# Patient Record
Sex: FEMALE | Race: WHITE
[De-identification: names, ages, dates, MRNs, and addresses within clinical notes are randomized per-mention and may not be internally consistent; named-entity substitution may affect disease eponyms.]

---

## 2016-06-17 VITALS
SYSTOLIC BLOOD PRESSURE: 146 MMHG | SYSTOLIC BLOOD PRESSURE: 146 MMHG | SYSTOLIC BLOOD PRESSURE: 146 MMHG | SYSTOLIC BLOOD PRESSURE: 146 MMHG | DIASTOLIC BLOOD PRESSURE: 78 MMHG | DIASTOLIC BLOOD PRESSURE: 78 MMHG | DIASTOLIC BLOOD PRESSURE: 78 MMHG | DIASTOLIC BLOOD PRESSURE: 78 MMHG

## 2017-03-03 ENCOUNTER — HOSPITAL ENCOUNTER (OUTPATIENT)
Dept: HOSPITAL 61 - CT | Age: 82
Discharge: HOME | End: 2017-03-03
Attending: RADIOLOGY
Payer: MEDICARE

## 2017-03-03 DIAGNOSIS — C34.90: Primary | ICD-10-CM

## 2017-03-03 LAB
CREAT SERPL-MCNC: 0.9 MG/DL (ref 0.6–1)
GFR SERPLBLD BASED ON 1.73 SQ M-ARVRAT: 59.8 ML/MIN

## 2017-03-03 PROCEDURE — 36415 COLL VENOUS BLD VENIPUNCTURE: CPT

## 2017-03-03 PROCEDURE — 71260 CT THORAX DX C+: CPT

## 2017-03-03 PROCEDURE — 82565 ASSAY OF CREATININE: CPT

## 2017-03-03 NOTE — RAD
Exam performed: CT chest with contrast.



History: History of lung cancer, follow-up exam.



Date of service: 03/03/17. Comparison: CT chest from 09/22/16.



Technique: Contiguous helical acquisitions are obtained through the chest

during intravenous administration of 75 cc of Omnipaque 300. Sagittal and

coronal reformatted images are obtained and reviewed.



Findings:



Redemonstration of a right upper lobe pleural-based mass with adjacent erosive

changes and chronic fracture involving the right third rib with streaky linear

opacities extending up to the right hilum. Previously seen large right

suprahilar lymph node appears significantly decreased in size. There is

significantly decreased mass effect and nodularity in the right main bronchus

as well as a bronchus intermedius. There is continued encasement of the right

upper lobe pulmonary artery secondary to the enlarged lymph node however the

significantly improved due to decreased size of the lymph node. No new lymph

nodes are seen. The remainder lungs are essentially clear. There are

emphysematous changes in both lungs. There are prominent interstitial changes

in the apical segment right lower lobe, new since previous study. The heart

size is within limits of normal without pericardial effusion. Atheromatous

aortic and coronary calcification. There is a calcified nodule in the right

lower lobe.



Small hiatal hernia. Abdominal aortic aneurysm status post stent graft

placement. Sclerotic abnormalities involving the thoracic spine at T10 and L1

remains similar.



Impression:



1.  Significant decrease in size of the right suprahilar lymph node with

improved nodularity and mass effect on the adjacent bronchus intermedius.

2.  Right upper lobe mass extending to the periphery of the chest wall with

sclerosis /erosion and chronic fracture of the right third rib remains

unchanged.

3.  Minimal prominence of interstitial markings in the apical segment right

upper lobe. These could be related to post therapeutic changes, however

continued follow-up to rule out possibility of lymphangitic spread of tumor

although is considered less likely given other areas of improvement.

## 2017-09-19 ENCOUNTER — HOSPITAL ENCOUNTER (OUTPATIENT)
Dept: HOSPITAL 61 - CT | Age: 82
Discharge: HOME | End: 2017-09-19
Attending: RADIOLOGY
Payer: MEDICARE

## 2017-09-19 DIAGNOSIS — Z92.3: ICD-10-CM

## 2017-09-19 DIAGNOSIS — I10: ICD-10-CM

## 2017-09-19 DIAGNOSIS — C34.11: Primary | ICD-10-CM

## 2017-09-19 PROCEDURE — 71260 CT THORAX DX C+: CPT

## 2017-09-19 NOTE — RAD
Indication lung malignancy. Follow-up.



Axial images through the chest were obtained. 75 cc of Omnipaque 300 was

administered. Comparison is made to the previous examination 3/3/2017.



Imaging through the upper abdomen shows no acute finding. Aortic stent graft

is noted. There is a small hiatus hernia. There is a density in the right

breast, image 23 series 2. This may reflect glandular tissue. If mammography

has not been recently performed this should be considered. There is some

coronary artery calcification. The thoracic aorta appears unremarkable.

Significant hilar or mediastinal adenopathy is not seen. There are underlying

emphysematous changes. There is a parenchymal opacity in the right upper lobe,

increased relative to the prior study and soft tissue fullness in the right

hilum. While these may be secondary to postradiation changes recurrent tumor

is not entirely excluded. An additional change in the chest is not seen. A

definite acute finding in the chest is not seen.



IMPRESSION: Increasing volume loss in the right upper lobe with associated

soft tissue opacity. Increasing hilar fullness. While these may be secondary

to post radiation changes recurrent tumor is not entirely excluded.

                          Possible nodule right breast. If mammography has not

been recently performed this should be considered











PQRS Compliance Statement:



One or more of the following individualized dose reduction techniques were

utilized for this examination:

1. Automated exposure control

2. Adjustment of the mA and/or kV according to patient size

3. Use of iterative reconstruction technique

## 2018-03-22 ENCOUNTER — HOSPITAL ENCOUNTER (OUTPATIENT)
Dept: HOSPITAL 61 - CT | Age: 83
Discharge: HOME | End: 2018-03-22
Attending: RADIOLOGY
Payer: MEDICARE

## 2018-03-22 DIAGNOSIS — Z92.3: ICD-10-CM

## 2018-03-22 DIAGNOSIS — I25.10: ICD-10-CM

## 2018-03-22 DIAGNOSIS — J43.9: ICD-10-CM

## 2018-03-22 DIAGNOSIS — C34.90: Primary | ICD-10-CM

## 2018-03-22 DIAGNOSIS — I70.0: ICD-10-CM

## 2018-03-22 DIAGNOSIS — K44.9: ICD-10-CM

## 2018-03-22 DIAGNOSIS — M41.85: ICD-10-CM

## 2018-03-22 PROCEDURE — 71260 CT THORAX DX C+: CPT

## 2018-03-22 RX ADMIN — IOHEXOL 1 ML: 300 INJECTION, SOLUTION INTRAVENOUS at 08:41

## 2018-03-30 ENCOUNTER — HOSPITAL ENCOUNTER (OUTPATIENT)
Dept: HOSPITAL 61 - KCIC MAMMO | Age: 83
Discharge: HOME | End: 2018-03-30
Attending: RADIOLOGY
Payer: MEDICARE

## 2018-03-30 DIAGNOSIS — N63.10: Primary | ICD-10-CM

## 2018-03-30 DIAGNOSIS — N64.89: ICD-10-CM

## 2018-03-30 DIAGNOSIS — Z92.3: ICD-10-CM

## 2018-03-30 DIAGNOSIS — Z85.118: ICD-10-CM

## 2018-03-30 PROCEDURE — 76641 ULTRASOUND BREAST COMPLETE: CPT

## 2018-03-30 PROCEDURE — 77066 DX MAMMO INCL CAD BI: CPT

## 2018-08-29 ENCOUNTER — HOSPITAL ENCOUNTER (OUTPATIENT)
Dept: HOSPITAL 61 - KCIC | Age: 83
Discharge: HOME | End: 2018-08-29
Attending: INTERNAL MEDICINE
Payer: COMMERCIAL

## 2018-08-29 DIAGNOSIS — M19.041: ICD-10-CM

## 2018-08-29 DIAGNOSIS — Z87.891: ICD-10-CM

## 2018-08-29 DIAGNOSIS — Z85.118: ICD-10-CM

## 2018-08-29 DIAGNOSIS — J43.2: ICD-10-CM

## 2018-08-29 DIAGNOSIS — M19.042: Primary | ICD-10-CM

## 2018-08-29 DIAGNOSIS — I25.10: ICD-10-CM

## 2018-08-29 DIAGNOSIS — I10: ICD-10-CM

## 2018-08-29 DIAGNOSIS — K21.9: ICD-10-CM

## 2018-08-29 DIAGNOSIS — E78.00: ICD-10-CM

## 2018-08-29 PROCEDURE — 73130 X-RAY EXAM OF HAND: CPT

## 2018-08-29 NOTE — [facility]
Three-view bilateral hand dated 8/29/2018.

 

No comparison available.

 

Clinical data indication: Pain. Pain for 20 years. Evaluate arthritis.

 

FINDINGS:

 

3 views of left hand show normal bony alignment. No displaced fracture. 

There is mild degenerative change of the first carpometacarpal joint and 

scaphotrapezial joint. Mild degenerative changes of the interphalangeal 

joints throughout. No periostitis or bone destruction. No erosive changes 

are seen.

 

3 views of the right hand show normal bony alignment. No displaced 

fracture. Mild degenerative change of the first carpometacarpal joint and 

scaphotrapezial joint. There is also mild degenerative change of the 

interphalangeal joints throughout and the MCP joint of the thumb. No 

erosive changes or periostitis. No bone destruction.

 

IMPRESSION:

1. No acute radiographic abnormality.

2. Multifocal osteoarthrosis as described above.

 

Electronically signed by: Parish Morillo MD (8/29/2018 1:08 PM) 

[facility]

## 2018-09-27 ENCOUNTER — HOSPITAL ENCOUNTER (OUTPATIENT)
Dept: HOSPITAL 61 - CT | Age: 83
Discharge: HOME | End: 2018-09-27
Attending: RADIOLOGY
Payer: COMMERCIAL

## 2018-09-27 DIAGNOSIS — I10: ICD-10-CM

## 2018-09-27 DIAGNOSIS — F17.200: ICD-10-CM

## 2018-09-27 DIAGNOSIS — C34.91: ICD-10-CM

## 2018-09-27 DIAGNOSIS — C34.12: Primary | ICD-10-CM

## 2018-09-27 DIAGNOSIS — E78.00: ICD-10-CM

## 2018-09-27 PROCEDURE — 71260 CT THORAX DX C+: CPT

## 2018-09-27 NOTE — RAD
Examination: CT CHEST W/CONTRAST

 

History: LUNG CA S/P TREATMENT INJ 75ML OMNI 300 PREV SENT 

 

Comparison/Correlation: 3/22/2018 CT chest with contrast

 

Findings: Axial images of the chest were obtained following 75 cc 

Omnipaque 300 IV. Sagittal and coronal reformatted images were provided.

 

Extensive emphysematous involvement of the lung fields identified.

 

Right upper lobe atelectasis and retained secretions noted. This 

atelectasis extends from the right hilum to the right lateral pleural and 

along the right mediastinum and hilar level. Associated destructive 

involvement of the right third rib and fourth rib again seen. Sclerosis of

the right second rib also seen. This process measures up to 7.9 cm x 3 cm 

x 4.3 cm longitudinal.

 

Thoracic aorta is unremarkable for age. No enlarged thoracic lymph nodes 

in the interval.

 

Upper abdominal aortic stent graft is partially seen.

 

Impression:

Right upper lobe atelectasis and retained secretions again seen. The 

portion which extends to the lateral pleura is decreased interval. 

Interval increase in atelectatic component extending along the mediastinum

is seen in the interval however. Mass component is not well delineated 

within this process. There is no delineated new mass component although 

atelectasis is increase in the interval along the mediastinum.

 

Electronically signed by: Cruz Hooker MD (9/27/2018 6:02 PM) [facility]

## 2019-01-14 ENCOUNTER — HOSPITAL ENCOUNTER (EMERGENCY)
Dept: HOSPITAL 61 - ER | Age: 84
Discharge: HOME | End: 2019-01-14
Payer: COMMERCIAL

## 2019-01-14 VITALS — BODY MASS INDEX: 19.32 KG/M2 | HEIGHT: 62 IN | WEIGHT: 105 LBS

## 2019-01-14 VITALS — SYSTOLIC BLOOD PRESSURE: 143 MMHG | DIASTOLIC BLOOD PRESSURE: 95 MMHG

## 2019-01-14 DIAGNOSIS — R91.8: ICD-10-CM

## 2019-01-14 DIAGNOSIS — I50.9: ICD-10-CM

## 2019-01-14 DIAGNOSIS — I11.0: ICD-10-CM

## 2019-01-14 DIAGNOSIS — J44.1: Primary | ICD-10-CM

## 2019-01-14 LAB
ALBUMIN SERPL-MCNC: 2.4 G/DL (ref 3.4–5)
ALBUMIN/GLOB SERPL: 0.5 {RATIO} (ref 1–1.7)
ALP SERPL-CCNC: 110 U/L (ref 46–116)
ALT SERPL-CCNC: 11 U/L (ref 14–59)
ANION GAP SERPL CALC-SCNC: 11 MMOL/L (ref 6–14)
AST SERPL-CCNC: 15 U/L (ref 15–37)
BASE EXCESS ABG: -1 MMOL/L (ref -3–3)
BASOPHILS # BLD AUTO: 0 X10^3/UL (ref 0–0.2)
BASOPHILS NFR BLD: 1 % (ref 0–3)
BILIRUB SERPL-MCNC: 0.3 MG/DL (ref 0.2–1)
BUN SERPL-MCNC: 11 MG/DL (ref 7–20)
BUN/CREAT SERPL: 14 (ref 6–20)
CALCIUM SERPL-MCNC: 9.4 MG/DL (ref 8.5–10.1)
CHLORIDE SERPL-SCNC: 101 MMOL/L (ref 98–107)
CO2 SERPL-SCNC: 26 MMOL/L (ref 21–32)
CREAT SERPL-MCNC: 0.8 MG/DL (ref 0.6–1)
EOSINOPHIL NFR BLD: 0.1 X10^3/UL (ref 0–0.7)
EOSINOPHIL NFR BLD: 2 % (ref 0–3)
ERYTHROCYTE [DISTWIDTH] IN BLOOD BY AUTOMATED COUNT: 15.6 % (ref 11.5–14.5)
GFR SERPLBLD BASED ON 1.73 SQ M-ARVRAT: 68.2 ML/MIN
GLOBULIN SER-MCNC: 5.2 G/DL (ref 2.2–3.8)
GLUCOSE SERPL-MCNC: 137 MG/DL (ref 70–99)
HCO3 BLDA-SCNC: 24 MMOL/L (ref 21–28)
HCT VFR BLD CALC: 37.9 % (ref 36–47)
HGB BLD-MCNC: 12.6 G/DL (ref 12–15.5)
INSPIRATION SETTING TIME VENT: (no result)
LYMPHOCYTES # BLD: 2.3 X10^3/UL (ref 1–4.8)
LYMPHOCYTES NFR BLD AUTO: 34 % (ref 24–48)
MCH RBC QN AUTO: 26 PG (ref 25–35)
MCHC RBC AUTO-ENTMCNC: 33 G/DL (ref 31–37)
MCV RBC AUTO: 79 FL (ref 79–100)
MONO #: 0.7 X10^3/UL (ref 0–1.1)
MONOCYTES NFR BLD: 10 % (ref 0–9)
NEUT #: 3.6 X10^3UL (ref 1.8–7.7)
NEUTROPHILS NFR BLD AUTO: 54 % (ref 31–73)
PCO2 BLDA: 39 MMHG (ref 35–46)
PLATELET # BLD AUTO: 238 X10^3/UL (ref 140–400)
PO2 BLDA: 73 MMHG (ref 65–108)
POTASSIUM SERPL-SCNC: 3.9 MMOL/L (ref 3.5–5.1)
PROT SERPL-MCNC: 7.6 G/DL (ref 6.4–8.2)
RBC # BLD AUTO: 4.8 X10^6/UL (ref 3.5–5.4)
SAO2 % BLDA: 94 % (ref 92–99)
SODIUM SERPL-SCNC: 138 MMOL/L (ref 136–145)
WBC # BLD AUTO: 6.7 X10^3/UL (ref 4–11)

## 2019-01-14 PROCEDURE — 80053 COMPREHEN METABOLIC PANEL: CPT

## 2019-01-14 PROCEDURE — 83880 ASSAY OF NATRIURETIC PEPTIDE: CPT

## 2019-01-14 PROCEDURE — 36600 WITHDRAWAL OF ARTERIAL BLOOD: CPT

## 2019-01-14 PROCEDURE — 93005 ELECTROCARDIOGRAM TRACING: CPT

## 2019-01-14 PROCEDURE — 85025 COMPLETE CBC W/AUTO DIFF WBC: CPT

## 2019-01-14 PROCEDURE — 71275 CT ANGIOGRAPHY CHEST: CPT

## 2019-01-14 PROCEDURE — 36415 COLL VENOUS BLD VENIPUNCTURE: CPT

## 2019-01-14 PROCEDURE — 82805 BLOOD GASES W/O2 SATURATION: CPT

## 2019-01-14 PROCEDURE — 71045 X-RAY EXAM CHEST 1 VIEW: CPT

## 2019-01-14 PROCEDURE — 96374 THER/PROPH/DIAG INJ IV PUSH: CPT

## 2019-01-14 PROCEDURE — 99284 EMERGENCY DEPT VISIT MOD MDM: CPT

## 2019-01-14 PROCEDURE — 94640 AIRWAY INHALATION TREATMENT: CPT

## 2019-01-14 PROCEDURE — 96375 TX/PRO/DX INJ NEW DRUG ADDON: CPT

## 2019-01-14 PROCEDURE — 84484 ASSAY OF TROPONIN QUANT: CPT

## 2019-01-14 NOTE — RAD
Portable chest, 1/14/2019:

 

HISTORY: Chest pain, shortness of breath, cough, lung cancer

 

Comparison is made to a CT study from 9/27/2018. The heart size is within 

normal limits. There is calcific plaquing the aorta. There are scattered 

parenchymal scars. There is an ongoing abnormal right upper lobe opacity 

with volume loss extending from the hilum to the pleura laterally. This 

most likely represents residual tumor and associated 

atelectasis/consolidation in this patient with a history of lung cancer. 

No new pulmonary abnormality is seen. There is no evidence of pleural 

fluid. There are surgical clips in the lower neck bilaterally. The upper 

end of an aortic stent is partially visualized in the upper abdomen.

 

IMPRESSION:

1. Ongoing abnormal right upper lobe opacities suggesting a combination of

residual neoplasm and associated atelectasis/consolidation. CT scanning 

would best define this process and evaluate stability, if clinically 

indicated.

2. No new pulmonary infiltrate is seen.

 

Electronically signed by: Rick Moritz, MD (1/14/2019 7:40 AM) [facility]

## 2019-01-14 NOTE — RAD
EXAM: CT chest with contrast - pulmonary embolus protocol

 

CLINICAL HISTORY: CP, ? lung mass, concern for PE

 

COMPARISON: CT 9/27/2018, 3/22/2018.

 

TECHNIQUE: CT of the chest following the administration of intravenous 

contrast during the pulmonary arterial phase. Axial, coronal and sagittal 

reformatted images were generated including MIP images.

 

---PQRS compliance statement - One or more of the following individualized

dose reduction techniques were utilized for this study:

1.  Automated exposure control

2.  Adjustment of the mA and/or kV according to patient size

3.  Use of iterative reconstruction technique---

 

FINDINGS:

CHEST: 

Diagnostic quality: Borderline Suboptimal based on phase of contrast and 

respiratory motion artifact.

Pulmonary emboli:  No pulmonary emboli to the level of the subsegmental 

branches. More peripheral vessels are not well assessed.

Right heart strain: None

Pulmonary arteries: Normal in caliber.

 

Trace pericardial fluid is likely physiologic. Heart is not enlarged. 

Coronary artery calcifications are seen.

 

Trace right pleural effusion. No left pleural effusion. No pneumothorax.

 

Emphysematous changes are seen. Right hilar soft tissue prominence with 

associated postobstructive atelectasis is seen, extending from the right 

suprahilar region to the right superior, lateral pleural surface. Compared

to 9/27/2018, the parenchymal consolidative opacities in the right lung 

with associated atelectasis has increased in size. Accurate measurement is

precluded based on technique however this measures approximately 8.6 x 6.8

x 5 cm compared to 7.9 x 4.3 x 3 cm.

 

A 1.1 x 0.8 cm (series 3 image 69) is more prominent on today's 

examination, previously measuring 1 x 0.5 cm. A spiculated left upper 

lobe/lingular mass measures 0.8 x 0.5 cm (series 3 image 80), previously 

0.6 x 0.5 cm when measured in a similar fashion.

 

Visualized Upper abdomen:  Aortic calcifications are seen with associated 

stent partially visualized within the aorta. Heterogeneous perfusion of 

the spleen, likely from phase of contrast. Focal low-attenuation at the 

falciform ligament within the liver, possibly focal fatty infiltration. 

Otherwise no discrete hepatic lesion is seen within the visualized 

portions of the liver. Small hiatal hernia.

 

Bones: Adjacent to the parenchymal consolidation in the right lung, the 

right third and fourth ribs appear mottled, likely from destructive 

process. Diffusely decreased bone mineral density. Osseous structures left

fifth and sixth rib fractures are chronic. However evaluation of the 

osseous structures is limited given degree of osteopenia and submitted MIP

images

 

IMPRESSION:

1.  On this mildly limited exam, no evidence for acute pulmonary embolus.

2.  The right suprahilar consolidative parenchymal opacities with 

associated volume loss/atelectasis has increased in size. Given the 

progressive increase in size and soft tissue density at the hilum and 

right perihilar region, associated/recurrent mass is suspected. If further

evaluation is necessary, consider PET/CT evaluation

3.  Enlarging lingular/left upper lobe lung nodules. These can also be 

further evaluated on the PET/CT scan.

 

Electronically signed by: Micah Mcgill MD (1/14/2019 8:51 AM) [facility]

## 2019-01-14 NOTE — PHYS DOC
Past Medical History


Past Medical History:  CAD, COPD, GERD, Hypertension, Other


Additional Past Medical Histor:  AAA


Past Surgical History:  Angioplasty


Alcohol Use:  None


Drug Use:  None





Adult General


Chief Complaint


Chief Complaint:  SHORTNESS OF BREATH





HPI


HPI





Patient is a 85  year old female history of CAD, COPD who presents with acute 

onset shortness of air starting her hours prior to ED arrival. Patient states 

she woke short of breath. No fevers chills, nausea vomiting or sweats. Reports 

nonproductive cough. Denies increased leg pain or swelling. On EMS arrival, 

patient was hypoxic but rather breathing 25-30 times per minute. Breathing 

treatment given an IV established. Patient is a non-smoker.[]





Review of Systems


Review of Systems


ROS as per HPI[]





All other systems were reviewed and found to be within normal limits, except as 

documented in this note.





Current Medications


Current Medications





Current Medications








 Medications


  (Trade)  Dose


 Ordered  Sig/Arun  Start Time


 Stop Time Status Last Admin


Dose Admin


 


 Albuterol/


 Ipratropium


  (Duoneb)  3 ml  1X  ONCE  1/14/19 07:30


 1/14/19 07:32 DC 1/14/19 07:32


3 ML


 


 Furosemide


  (Lasix)  40 mg  1X  ONCE  1/14/19 07:30


 1/14/19 07:32 DC 1/14/19 08:03


40 MG


 


 Info


  (CONTRAST GIVEN


 -- Rx MONITORING)  1 each  PRN DAILY  PRN  1/14/19 08:15


 1/16/19 08:14   





 


 Iohexol


  (Omnipaque 350


 Mg/ml)  75 ml  1X  ONCE  1/14/19 08:15


 1/14/19 08:16 DC  





 


 Methylprednisolone


 Sodium Succinate


  (SOLU-Medrol


 125MG VIAL)  125 mg  1X  ONCE  1/14/19 06:15


 1/14/19 06:18 DC 1/14/19 07:22


125 MG











Allergies


Allergies





Allergies








Coded Allergies Type Severity Reaction Last Updated Verified


 


  Penicillins Allergy Severe Shortness of Air 6/17/16 Yes


 


  celecoxib Allergy Severe Hives 6/17/16 Yes











Physical Exam


Physical Exam





Constitutional: Well developed, well nourished, no acute distress, non-toxic 

appearance. []


HENT: Normocephalic, atraumatic, bilateral external ears normal, oropharynx 

moist, no oral exudates, nose normal. []


Eyes: PERRLA, EOMI, conjunctiva normal, no discharge. [] 


Neck: Normal range of motion, no tenderness, supple, no stridor. [] 


Cardiovascular:Heart rate regular rhythm, no murmur []


Lungs & Thorax: Respirations nonlabored, coarse diminished breath sounds 

bilaterally.[]


Abdomen: Bowel sounds normal, soft, no tenderness. [] 


Skin: Warm, dry, no erythema, no rash. [] 


Back: No tenderness. [] 


Extremities: No h/o dvt. [] 


Neurologic: Alert and oriented X 3, normal motor function, normal sensory 

function, no focal deficits noted. []


Psychologic: Affect normal, judgement normal, mood normal. []





Current Patient Data


Vital Signs





 Vital Signs








  Date Time  Temp Pulse Resp B/P (MAP) Pulse Ox O2 Delivery O2 Flow Rate FiO2


 


1/14/19 07:32     4 Room Air  


 


1/14/19 06:07 98.9 107 18 183/85 (117)    





 98.9       








Lab Values





 Laboratory Tests








Test


 1/14/19


06:16 1/14/19


06:50


 


White Blood Count


 6.7 x10^3/uL


(4.0-11.0) 





 


Red Blood Count


 4.80 x10^6/uL


(3.50-5.40) 





 


Hemoglobin


 12.6 g/dL


(12.0-15.5) 





 


Hematocrit


 37.9 %


(36.0-47.0) 





 


Mean Corpuscular Volume


 79 fL ()


 





 


Mean Corpuscular Hemoglobin 26 pg (25-35)   


 


Mean Corpuscular Hemoglobin


Concent 33 g/dL


(31-37) 





 


Red Cell Distribution Width


 15.6 %


(11.5-14.5)  H 





 


Platelet Count


 238 x10^3/uL


(140-400) 





 


Neutrophils (%) (Auto) 54 % (31-73)   


 


Lymphocytes (%) (Auto) 34 % (24-48)   


 


Monocytes (%) (Auto) 10 % (0-9)  H 


 


Eosinophils (%) (Auto) 2 % (0-3)   


 


Basophils (%) (Auto) 1 % (0-3)   


 


Neutrophils # (Auto)


 3.6 x10^3uL


(1.8-7.7) 





 


Lymphocytes # (Auto)


 2.3 x10^3/uL


(1.0-4.8) 





 


Monocytes # (Auto)


 0.7 x10^3/uL


(0.0-1.1) 





 


Eosinophils # (Auto)


 0.1 x10^3/uL


(0.0-0.7) 





 


Basophils # (Auto)


 0.0 x10^3/uL


(0.0-0.2) 





 


Sodium Level


 138 mmol/L


(136-145) 





 


Potassium Level


 3.9 mmol/L


(3.5-5.1) 





 


Chloride Level


 101 mmol/L


() 





 


Carbon Dioxide Level


 26 mmol/L


(21-32) 





 


Anion Gap 11 (6-14)   


 


Blood Urea Nitrogen


 11 mg/dL


(7-20) 





 


Creatinine


 0.8 mg/dL


(0.6-1.0) 





 


Estimated GFR


(Cockcroft-Gault) 68.2  


 





 


BUN/Creatinine Ratio 14 (6-20)   


 


Glucose Level


 137 mg/dL


(70-99)  H 





 


Calcium Level


 9.4 mg/dL


(8.5-10.1) 





 


Total Bilirubin


 0.3 mg/dL


(0.2-1.0) 





 


Aspartate Amino Transferase


(AST) 15 U/L (15-37)


 





 


Alanine Aminotransferase (ALT)


 11 U/L (14-59)


L 





 


Alkaline Phosphatase


 110 U/L


() 





 


Troponin I Quantitative


 < 0.017 ng/mL


(0.000-0.055) 





 


NT-Pro-B-Type Natriuretic


Peptide 811 pg/mL


(0-449)  H 





 


Total Protein


 7.6 g/dL


(6.4-8.2) 





 


Albumin


 2.4 g/dL


(3.4-5.0)  L 





 


Albumin/Globulin Ratio


 0.5 (1.0-1.7)


L 





 


O2 Saturation  94 % (92-99)  


 


Arterial Blood pH


 


 7.41


(7.35-7.45)


 


Arterial Blood pCO2 at


Patient Temp 


 39 mmHg


(35-46)


 


Arterial Blood pO2 at Patient


Temp 


 73 mmHg


()


 


Arterial Blood HCO3


 


 24 mmol/L


(21-28)


 


Arterial Blood Base Excess


 


 -1 mmol/L


(-3-3)


 


FiO2  21%  





 Laboratory Tests


1/14/19 06:16








 Laboratory Tests


1/14/19 06:16














EKG


EKG


[EKG: reviewed]





Radiology/Procedures


Radiology/Procedures


[CXR:  





CTA: No PE, right suprahilar consulted parenchymal opacities suspected 

associated/recurrent mass with parenchymal consolidation. PET/CT evaluation 

recommended]





Course & Med Decision Making


Course & Med Decision Making


Pertinent Labs and Imaging studies reviewed. (See chart for details)





[Breathing treatment, steroids, Lasix given with some improvement. Patient 

increased air movement, no wheezing on re-exam. Patient remains mildly 

tachypnea. Patient recommended but declined. Patient prefers to follow-up her 

PCP. Case discussed with Dr. Anna on on-call for Dr. Schaffer. Dr. Grey 

available to see Dr. Grey is available to see to patient later this week or 

Dr. Schaffer next week.  Will tx empirically.  Return precautions reviewed. ]





Dragon Disclaimer


Dragon Disclaimer


This electronic medical record was generated, in whole or in part, using a 

voice recognition dictation system.





Departure


Departure


Impression:  


 Primary Impression:  


 COPD exacerbation


 Additional Impressions:  


 Abnormal CT scan, chest


 Congestive cardiac failure


Disposition:  01 HOME, SELF-CARE


Condition:  GOOD


Referrals:  


JAYLENE SCHAFFER MD (PCP)


Patient Instructions:  Chronic Obstructive Pulmonary Disease Exacerbation, Easy-

to-Read





Additional Instructions:  


You were evaluated emergency department for shortness of air. Lab and imaging 

studies were performed. CT scan was abnormal and requires reviewed with primary 

care physician. Please take steroids, antibiotics and diuretics as directed. 

Continue home inhaler and use 1-2 puffs every 2-4 hours as needed. Contact Dr. Shoshana Grey on call for Dr. Schaffer and schedule an office appointment later 

this week. Him, he develop new or worsening symptoms, return to the emergency 

department.


Scripts


Furosemide (LASIX) 20 Mg Tablet


1 TAB PO DAILY, #3 TAB 1 Refill


   Prov: ROLANDO GRIFFITHS DO         1/14/19 


Prednisone (PREDNISONE) 50 Mg Tablet


1 TAB PO DAILY, #5 TAB


   Prov: ROLANDO GRIFFITHS DO         1/14/19 


Doxycycline Hyclate (DOXYCYCLINE HYCLATE) 100 Mg Tablet


1 TAB PO BID, #20 TAB


   Prov: ROLANDO GRIFFITHS DO         1/14/19





Problem Qualifiers











ROLANDO GRIFFITHS DO Jan 14, 2019 07:29

## 2019-01-14 NOTE — EKG
[facility]

              [address] [place] [address]

Test Date:    2019               Test Time:    06:52:38

Pat Name:     PILAR RAMOS              Department:   

Patient ID:   [id number]           Room:          

Gender:       F                        Technician:   

:          [id number]               Requested By: ROLANDO GRIFFITHS

Order Number: [id number]           Reading MD:   Tray Goss MD

                                 Measurements

Intervals                              Axis          

Rate:         110                      P:            

DE:                                    QRS:          -28

QRSD:         78                       T:            92

QT:           332                                    

QTc:          454                                    

                           Interpretive Statements

SINUS TACHYCARDIA

non-specific st/t changes

Electronically Signed On 1- 12:12:54 CST by Tray Goss MD

## 2019-02-19 VITALS — SYSTOLIC BLOOD PRESSURE: 140 MMHG | DIASTOLIC BLOOD PRESSURE: 77 MMHG

## 2019-03-16 ENCOUNTER — HOSPITAL ENCOUNTER (EMERGENCY)
Dept: HOSPITAL 61 - ER | Age: 84
End: 2019-03-16
Payer: COMMERCIAL

## 2019-03-16 DIAGNOSIS — I10: ICD-10-CM

## 2019-03-16 DIAGNOSIS — Z95.5: ICD-10-CM

## 2019-03-16 DIAGNOSIS — I25.10: ICD-10-CM

## 2019-03-16 DIAGNOSIS — Z88.8: ICD-10-CM

## 2019-03-16 DIAGNOSIS — Z88.0: ICD-10-CM

## 2019-03-16 DIAGNOSIS — I46.9: Primary | ICD-10-CM

## 2019-03-16 DIAGNOSIS — K21.9: ICD-10-CM

## 2019-03-16 DIAGNOSIS — J44.9: ICD-10-CM
